# Patient Record
(demographics unavailable — no encounter records)

---

## 2024-10-31 NOTE — PHYSICAL EXAM
[Normal] : moves all extremities, no focal deficits, normal speech [de-identified] :  No carotid bruits auscultated bilaterally.

## 2024-10-31 NOTE — CARDIOLOGY SUMMARY
At last visit, Omeprazole was started instead and Ranitidine discontinued. Please confirm with Mom if this is what they are giving.  Peggy Borja, CNP    [de-identified] : 10/31/2024, NSR, normal ECG

## 2024-10-31 NOTE — HISTORY OF PRESENT ILLNESS
[FreeTextEntry1] : 61 year old female with PMHx of HTN, HLD presents for a cardiac evaluation. On Monday, while at work, patient had chest pain, nausea, sweaty. Son thinks it was anxiety, panic attack. No recurrence.  There is no history of MI, CVA, CHF, or previous coronary intervention.

## 2024-10-31 NOTE — DISCUSSION/SUMMARY
[FreeTextEntry1] : 1. Chest Pain: recommend ETT and echocardiogram.  2. HTN: Goal BP less than 130/80. Recommend low salt diet. Continue losartan 25mg daily.  3. HLD: continue rosuvastatin 10mg daily.  Office will call with results.  Follow up in 1 year. [EKG obtained to assist in diagnosis and management of assessed problem(s)] : EKG obtained to assist in diagnosis and management of assessed problem(s)